# Patient Record
(demographics unavailable — no encounter records)

---

## 2025-04-22 NOTE — PHYSICAL EXAM
[FreeTextEntry1] : Patient sitting comfortably in the chair, no sign of distress. No tremor at time of assessment.  Oriented to time, place, situation and self. Immediate recall 3/3, delayed recall 1/3 even with cueing.  Language comprehension and production normal, mild hypophonia.  Pupils symmetric and reactive. EOM: mild limitation f vertical gaze with some level of gaze apraxia.  Face/shoulder shrug symmetric. Tongue/palate midline. Noted moderate dyskinesias to L UE most evident when walking.  Strength normal. No clear cogwheeling.  Moderate bradykinesia strikingly asymmetric for L > R with incoordination and mild dyspraxia.  Sensation to light touch intact throughout.  No dysmetria on finger-to-nose, minimal kinetic tremor b/l.  Raises from chair without using hands. Negative Romberg.  Walks independently, mildly reduced stride, excess arm swing and dyskinesias on the L, unstable on turning.  Pull test positive without recovery.

## 2025-04-22 NOTE — DISCUSSION/SUMMARY
[FreeTextEntry1] : 66 year old woman here for a second opinion on recent diagnosis of PD in the setting of increasing tremor to bilateral LEs, helped by levodopa with appearance of early development of dyskinesias and fluctuations.  Exam is consistent with asymmetric parkinsonism with levodopa induced dyskinesias and motor fluctuations. A course of 6 months would be highly atypical in this setting, and we are going to attempt to get in touch with her son to get collateral.  Plan:  - stop pramipexole  - take entacapone with each dose of levodopa  - send to clinic for review MRI brain and neurocogniitve assessment (reportedly done recently) - asked patient to tell her son to call the office when available to get collateral - start PT/OT - RTC in 6-8 weeks

## 2025-04-22 NOTE — HISTORY OF PRESENT ILLNESS
[FreeTextEntry1] : Mrs. Daphne Dunbar is a 66 year old woman here for a first assessment about leg tremors and history of neuropathy. She presents to the appointment by herself.   She reports a history of bilateral leg shaking in the past 6 months, worse on the left side, occurring more when sitting and in bed, and more frequently in the afternoon around 4-5 pm. She was seeing a neurologist before for left arm pain from a "pinched nerve" and was diagnosed with neuropathy secondary to diabetes. However, when asked about her medications it appears she has been taking levodopa. When questioned about it, she said that the diagnosis was initially PD but it was then revisited to neuropathy and she is here for a second opinion encouraged by her son who is an emergency medicine doctor. No prior documentation available for review.  She states she was initially prescribed levodopa at a dose of 25/100 mg 1 tab 4 times daily and this seemed to help with the tremor. More recently, entacapone 200 mg 1 tab 2 times daily (am and pm dose) was added as she was having more shaking towards time of dosing, and she was also prescribed pramipexole 0.125 mg BID which she is taking once daily. She reports some hand dexterity difficulties especially on the left, slow walking with mild shuffling when she has the tremor, no falls but she is being very cautious, no issues with balance, mild forgetfulness. She has a history of diabetes, mood depression since the passing of her  3 years ago, HTN, CAD s/p stent and peripheral arteriopathy s/p stent in LLE vessel. She lives alone but has been helped by an aide for the past 2 years (from 9 am to 3 pm). 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 She also has support from neighbors and family. She reports being less active than she used to be, she is scared about falling and shaking. She has stairs to get in the house she needs help navigating and therefore is unable to leave the house by herself.  She reports that when she takes levodopa her tremor goes away in 15 min, and then usually resurfaces at time of dosing (9-1-3-6). She takes entacapone at 9 and 6 and is not sure if duration of effect is longer. She shows me a video of shaking in which she has classical L hand tremor and bilateral leg tremor while standing, taken at about 6 in the afternoon which seems consistent with motor off.   She denies any history of hallucinations, she denies lightheadedness upon standing. Endorses mild forgetfulness. It is unclear if symptoms were present more than 6 months prior.  She was on PT for her arm but now has stopped.  She gets some help with ADLs such as getting dressed.  She mostly gets help with iADLs.  It appears she was on trazodone and clonazepam in the past, but she denies taking them currently.